# Patient Record
Sex: FEMALE | Race: WHITE | NOT HISPANIC OR LATINO | Employment: STUDENT | URBAN - METROPOLITAN AREA
[De-identification: names, ages, dates, MRNs, and addresses within clinical notes are randomized per-mention and may not be internally consistent; named-entity substitution may affect disease eponyms.]

---
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
## 2025-01-31 ENCOUNTER — OFFICE VISIT (OUTPATIENT)
Dept: PEDIATRICS | Facility: CLINIC | Age: 20
End: 2025-01-31
Payer: COMMERCIAL

## 2025-01-31 VITALS
HEART RATE: 90 BPM | BODY MASS INDEX: 22.16 KG/M2 | DIASTOLIC BLOOD PRESSURE: 74 MMHG | SYSTOLIC BLOOD PRESSURE: 110 MMHG | HEIGHT: 65 IN | WEIGHT: 133 LBS

## 2025-01-31 DIAGNOSIS — G40.909 SEIZURE DISORDER (MULTI): ICD-10-CM

## 2025-01-31 DIAGNOSIS — Z00.00 ENCOUNTER FOR GENERAL ADULT MEDICAL EXAMINATION W/O ABNORMAL FINDINGS: Primary | ICD-10-CM

## 2025-01-31 DIAGNOSIS — K90.0 CELIAC DISEASE (HHS-HCC): ICD-10-CM

## 2025-01-31 DIAGNOSIS — E10.9 TYPE 1 DIABETES MELLITUS WITHOUT COMPLICATION: ICD-10-CM

## 2025-01-31 PROBLEM — H90.42 SENSORINEURAL HEARING LOSS (SNHL) OF LEFT EAR WITH UNRESTRICTED HEARING OF RIGHT EAR: Status: ACTIVE | Noted: 2022-06-15

## 2025-01-31 PROBLEM — G80.8: Status: ACTIVE | Noted: 2019-02-15

## 2025-01-31 PROBLEM — F43.10 PTSD (POST-TRAUMATIC STRESS DISORDER): Status: ACTIVE | Noted: 2023-01-29

## 2025-01-31 PROCEDURE — 3008F BODY MASS INDEX DOCD: CPT | Performed by: PEDIATRICS

## 2025-01-31 PROCEDURE — 1036F TOBACCO NON-USER: CPT | Performed by: PEDIATRICS

## 2025-01-31 PROCEDURE — 3074F SYST BP LT 130 MM HG: CPT | Performed by: PEDIATRICS

## 2025-01-31 PROCEDURE — 3078F DIAST BP <80 MM HG: CPT | Performed by: PEDIATRICS

## 2025-01-31 PROCEDURE — 99395 PREV VISIT EST AGE 18-39: CPT | Performed by: PEDIATRICS

## 2025-01-31 RX ORDER — DOCUSATE SODIUM 100 MG/1
1 CAPSULE, LIQUID FILLED ORAL
COMMUNITY
Start: 2024-03-21

## 2025-01-31 RX ORDER — INSULIN PMP CART,AUT,G6/7,CNTR
EACH SUBCUTANEOUS
COMMUNITY
Start: 2025-01-03

## 2025-01-31 ASSESSMENT — PATIENT HEALTH QUESTIONNAIRE - PHQ9
8. MOVING OR SPEAKING SO SLOWLY THAT OTHER PEOPLE COULD HAVE NOTICED. OR THE OPPOSITE - BEING SO FIDGETY OR RESTLESS THAT YOU HAVE BEEN MOVING AROUND A LOT MORE THAN USUAL: NOT AT ALL
1. LITTLE INTEREST OR PLEASURE IN DOING THINGS: NOT AT ALL
4. FEELING TIRED OR HAVING LITTLE ENERGY: NOT AT ALL
3. TROUBLE FALLING OR STAYING ASLEEP: MORE THAN HALF THE DAYS
5. POOR APPETITE OR OVEREATING: NOT AT ALL
6. FEELING BAD ABOUT YOURSELF - OR THAT YOU ARE A FAILURE OR HAVE LET YOURSELF OR YOUR FAMILY DOWN: NOT AT ALL
2. FEELING DOWN, DEPRESSED OR HOPELESS: NOT AT ALL
9. THOUGHTS THAT YOU WOULD BE BETTER OFF DEAD, OR OF HURTING YOURSELF: NOT AT ALL
7. TROUBLE CONCENTRATING ON THINGS, SUCH AS READING THE NEWSPAPER OR WATCHING TELEVISION: NOT AT ALL

## 2025-01-31 ASSESSMENT — LIFESTYLE VARIABLES
HOW OFTEN DO YOU HAVE A DRINK CONTAINING ALCOHOL: NEVER
HOW OFTEN DO YOU HAVE SIX OR MORE DRINKS ON ONE OCCASION: NEVER
AUDIT-C TOTAL SCORE: 0
HOW MANY STANDARD DRINKS CONTAINING ALCOHOL DO YOU HAVE ON A TYPICAL DAY: PATIENT DOES NOT DRINK
SKIP TO QUESTIONS 9-10: 1

## 2025-01-31 ASSESSMENT — PAIN SCALES - GENERAL: PAINLEVEL_OUTOF10: 0-NO PAIN

## 2025-01-31 NOTE — PROGRESS NOTES
"Subjective     Beverley Bales is a 19 y.o. female who is here for this well-child visit.    Concerns: none    School: [employer], [employer] class - bio  Future Plans: criminal justice    Diet: eats well, some dairy  Exercise:  starting to run  Sleep: tries to get 9 hours  Periods are regular    Anticipatory Guidance:  Always wear seatbelt, do not text and drive, discussed nutrition and exercise, discussed safety and good decision making, recommend annual influenza vaccine.    /74   Pulse 90   Ht 1.657 m (5' 5.25\")   Wt 60.3 kg (133 lb)   BMI 21.96 kg/m²     General:  Well appearing   Eyes:   Sclera clear   Mouth: Mucous membranes moist, lips, teeth, gums normal   Throat clear   Ears: Tympanic membranes normal   Heart Regular rate and rhythm, no murmurs   Lungs clear   Abdomen:  soft, non-tender, no masses, no organomegaly     Assessment and Plan:    1. Encounter for general adult medical examination w/o abnormal findings      doing well, healthy BMI.  we discussed transition to adult physician after 20 year well visit      2. Celiac disease (HHS-HCC)      stable      3. Seizure disorder (Multi)      due for neurology follow up      4. Type 1 diabetes mellitus without complication      stable, recent endocrine note reviewed, due for eye exam        "

## 2025-02-06 ENCOUNTER — CLINICAL SUPPORT (OUTPATIENT)
Dept: AUDIOLOGY | Facility: CLINIC | Age: 20
End: 2025-02-06
Payer: COMMERCIAL

## 2025-02-06 DIAGNOSIS — H90.42 SENSORINEURAL HEARING LOSS (SNHL) OF LEFT EAR WITH UNRESTRICTED HEARING OF RIGHT EAR: ICD-10-CM

## 2025-02-06 PROCEDURE — 92557 COMPREHENSIVE HEARING TEST: CPT | Performed by: SOCIAL WORKER

## 2025-02-06 PROCEDURE — 92550 TYMPANOMETRY & REFLEX THRESH: CPT | Mod: 52 | Performed by: SOCIAL WORKER

## 2025-02-06 NOTE — PROGRESS NOTES
Name: Beverley Bales  YOB: 2005  Age: 19 y.o.    Date of Evaluation:  2/6/25    History:  Reason for visit:  Beverley Bales is seen today at the request of Radha Harris MD   for an evaluation of hearing.  Patient complains of Dizziness (Single sided deafness, Left ear).  Beverley has had single sided deafness since at least . She was previously evaluated at the [facility] in 2022. She previously saw physical therapy for vestibular rehabilitation.  She used to wear a CROS aid system that was dispensed in 2016. She is not currently utilizing any devices.    Evaluation:  Otoscopy revealed clear canals bilaterally  Immittance testing indicated type A normal tympanograms bilaterally  Ipsilateral acoustic reflexes were present in the right ear and absent in the left ear at 500-4000 Hz  Behavioral hearing testing indicated normal hearing at 125-8000 Hz in the right ear and profound in the left ear (no response at the limits of the audiometer)  Word recognition testing was completed using recorded speech at the patient's most comfortable level as documented on the audiogram.    Scores were 96% in the right ear and 0% correct in the left ear      Summary:  Today's results are consistent with normal hearing at 125-8000 Hz in the right ear and profound in the left ear (no response at the limits of the audiometer)  Word recognition is excellent in the right ear and extremely poor in the left ear at conversational loudness  Treatment options were discussed with Beverley and her father.   She was given information regarding CROS hearing aids and bone conduction implant systems.    Treatment Plan:  Follow up with PCP as directed  Retest hearing in 12 months  Consider a CROS hearing aid or ELLA evaluation  Consider vestibular workup

## 2025-03-02 ENCOUNTER — OFFICE VISIT (OUTPATIENT)
Dept: URGENT CARE | Age: 20
End: 2025-03-02
Payer: COMMERCIAL

## 2025-03-02 VITALS
BODY MASS INDEX: 20.73 KG/M2 | HEART RATE: 88 BPM | WEIGHT: 129 LBS | RESPIRATION RATE: 18 BRPM | SYSTOLIC BLOOD PRESSURE: 103 MMHG | OXYGEN SATURATION: 99 % | TEMPERATURE: 98.1 F | HEIGHT: 66 IN | DIASTOLIC BLOOD PRESSURE: 71 MMHG

## 2025-03-02 DIAGNOSIS — B96.89 BACTERIAL URI: ICD-10-CM

## 2025-03-02 DIAGNOSIS — J06.9 BACTERIAL URI: ICD-10-CM

## 2025-03-02 LAB
POC RAPID INFLUENZA A: NEGATIVE
POC RAPID INFLUENZA B: NEGATIVE
POC RAPID MONO: NEGATIVE
POC RAPID STREP: NEGATIVE
POC SARS-COV-2 AG BINAX: NORMAL

## 2025-03-02 PROCEDURE — 99203 OFFICE O/P NEW LOW 30 MIN: CPT | Performed by: SURGERY

## 2025-03-02 PROCEDURE — 87811 SARS-COV-2 COVID19 W/OPTIC: CPT | Performed by: SURGERY

## 2025-03-02 PROCEDURE — 86308 HETEROPHILE ANTIBODY SCREEN: CPT | Performed by: SURGERY

## 2025-03-02 PROCEDURE — 1036F TOBACCO NON-USER: CPT | Performed by: SURGERY

## 2025-03-02 PROCEDURE — 87804 INFLUENZA ASSAY W/OPTIC: CPT | Performed by: SURGERY

## 2025-03-02 PROCEDURE — 3074F SYST BP LT 130 MM HG: CPT | Performed by: SURGERY

## 2025-03-02 PROCEDURE — 87880 STREP A ASSAY W/OPTIC: CPT | Performed by: SURGERY

## 2025-03-02 PROCEDURE — 3078F DIAST BP <80 MM HG: CPT | Performed by: SURGERY

## 2025-03-02 PROCEDURE — 3008F BODY MASS INDEX DOCD: CPT | Performed by: SURGERY

## 2025-03-02 RX ORDER — AZITHROMYCIN 250 MG/1
TABLET, FILM COATED ORAL
Qty: 6 TABLET | Refills: 0 | Status: SHIPPED | OUTPATIENT
Start: 2025-03-02 | End: 2025-03-07

## 2025-03-02 NOTE — PROGRESS NOTES
Chief Complaint   Patient presents with    Sore Throat     Sore throat, swollen lymph nodes, cough x 7 days.       Physical Exam:     GEN: No acute distress    ENT: Bilateral TMs and canals unremarkable, sinus congestion present. Pharynx and tonsils mildly hyperemic but without exudate.     Resp: Lungs clear to auscultation bilaterally       POC Labs:     Office Visit on 03/02/2025   Component Date Value Ref Range Status    POC Rapid Strep 03/02/2025 Negative  Negative Final    POC MICHAEL-COV-2 AG 03/02/2025 Presumptive negative test for SARS-CoV-2 (no antigen detected)  Presumptive negative test for SARS-CoV-2 (no antigen detected) Final    POC Rapid Mono 03/02/2025 Negative  Negative Final    POC Rapid Influenza A 03/02/2025 Negative  Negative Final    POC Rapid Influenza B 03/02/2025 Negative  Negative Final        Encounter Diagnosis   Name Primary?    Bacterial URI         Medical Decision Making & Plan:   Azithromycin    Home      03/02/25 at 10:17 AM - Janeth Lloyd, DO

## 2025-04-11 ENCOUNTER — NUTRITION (OUTPATIENT)
Dept: PEDIATRIC ENDOCRINOLOGY | Facility: CLINIC | Age: 20
End: 2025-04-11

## 2025-04-11 ENCOUNTER — OFFICE VISIT (OUTPATIENT)
Dept: PEDIATRIC ENDOCRINOLOGY | Facility: CLINIC | Age: 20
End: 2025-04-11
Payer: COMMERCIAL

## 2025-04-11 VITALS
BODY MASS INDEX: 22.77 KG/M2 | OXYGEN SATURATION: 100 % | WEIGHT: 136.69 LBS | HEART RATE: 76 BPM | DIASTOLIC BLOOD PRESSURE: 67 MMHG | SYSTOLIC BLOOD PRESSURE: 100 MMHG | HEIGHT: 65 IN

## 2025-04-11 DIAGNOSIS — E10.9 TYPE 1 DIABETES MELLITUS WITHOUT COMPLICATION: ICD-10-CM

## 2025-04-11 LAB — POC HEMOGLOBIN A1C: 7.1 % (ref 4.2–6.5)

## 2025-04-11 PROCEDURE — 3078F DIAST BP <80 MM HG: CPT | Performed by: PEDIATRICS

## 2025-04-11 PROCEDURE — 3008F BODY MASS INDEX DOCD: CPT | Performed by: PEDIATRICS

## 2025-04-11 PROCEDURE — 3074F SYST BP LT 130 MM HG: CPT | Performed by: PEDIATRICS

## 2025-04-11 PROCEDURE — 99214 OFFICE O/P EST MOD 30 MIN: CPT | Mod: 25 | Performed by: PEDIATRICS

## 2025-04-11 PROCEDURE — 83036 HEMOGLOBIN GLYCOSYLATED A1C: CPT | Performed by: PEDIATRICS

## 2025-04-11 PROCEDURE — 99214 OFFICE O/P EST MOD 30 MIN: CPT | Performed by: PEDIATRICS

## 2025-04-11 PROCEDURE — 95251 CONT GLUC MNTR ANALYSIS I&R: CPT | Performed by: PEDIATRICS

## 2025-04-11 PROCEDURE — 3051F HG A1C>EQUAL 7.0%<8.0%: CPT | Performed by: PEDIATRICS

## 2025-04-11 RX ORDER — URINE ACETONE TEST STRIPS
1 STRIP MISCELLANEOUS AS NEEDED
Qty: 25 EACH | Refills: 11 | Status: SHIPPED | OUTPATIENT
Start: 2025-04-11 | End: 2025-04-14 | Stop reason: SDUPTHER

## 2025-04-11 ASSESSMENT — PAIN SCALES - GENERAL: PAINLEVEL_OUTOF10: 0-NO PAIN

## 2025-04-11 NOTE — PROGRESS NOTES
[facility]  [facility]    Subjective   Beverley Bales is a 19 y.o. female with type 1 diabetes. Dx'd 2011.   Today Beverley presents to clinic with her father.     HPI  Other Medical History:   H/o seizure disorder, on lamictal, no seizures for about a decade.  Has vertigo, on OTC magnesium     Manages diabetes with OP5+Dexcom G6  TIR: 76%  Lows: 2%  High: 22%     Auto mode: 97%    TDD: 43.2 U  24 hr basal: 21.3 U  Bolus/day: 5  Carbs: 110 g/daily    Concerns at this visit: Excessive urination for about 2 months, mostly at night, wakes up several times to pee. Denies drinking fluids before bedtime.      Social:    Doing well at [employer].     Insulin Injections/Pump sites:   - Gives mealtime insulin before eating.  - Site rotation: yes     Carbohydrate counting:   - Patient states they are good at counting carbs.  - Patient states they are good at adherence to bolusing for carbs.     Other:   Hypoglycemia:  - uses glucose tabs to treat lows  - treats with 2 gms carbs  - Nocturnal hypoglycemia: no  Checks ketones with: ketone strips.      Exercise: No exercise now, before running.     Periods: regular      Education Reviewed:      Goals    None              Insulin Delivery  Diabetes Management Regimen: (Patient-Rptd) Pump  Pump Type: (Patient-Rptd) Omnipod  Using AID System: (Patient-Rptd) Yes  Using Smart Pen device: (Patient-Rptd) No    Glucose Monitoring  How do you primarily monitor blood sugars?: (Patient-Rptd) CGM  CGM Type: (Patient-Rptd) Dexcom G6    Clinical Details  Hypoglycemia Unawareness : (Patient-Rptd) No  Severe Hypoglycemia (coma, seizure, disorientation, or the need for high dose glucagon) since last visit: (Patient-Rptd) No    Hospitalizations (since last endocrine appointment)  ED/Hospitalizations related to Diabetes: (Patient-Rptd) No  ED/Hospitalization not related to Diabetes: (Patient-Rptd) No  ED/Hospitalization related  "to DKA: (Patient-Rptd) No                   Review of Systems  12 point review of systems has been performed. Except for what has been documented, review of systems was otherwise negative.   Objective   /67 (BP Location: Right arm, Patient Position: Sitting)   Pulse 76   Ht 1.651 m (5' 5\")   Wt 62 kg (136 lb 11 oz)   SpO2 100%   BMI 22.75 kg/m²      Physical Exam   Constitutional: Alert and awake, no acute distress.   Eyes: EOMI   ENMT: Moist oral mucosa.   Head/Neck: Supple, no masses, thyroid not enlarged, no palpable nodules.    Respiratory/Thorax: CTAB, no rales or crackles.   Cardiovascular: RRR, no murmurs.   Gastrointestinal: Soft, NT/ND.   Neurological: Alert, no focal deficits.   Skin: Warm, well perfused. No acanthosis. No lipodystrophy.   Lab  Lab Results   Component Value Date    HGBA1C 7.1 (A) 04/11/2025    HGBA1C 7.5 (A) 12/13/2024    HGBA1C 7.7 (H) 11/01/2024    HGBA1C 6.9 (A) 09/13/2024       Assessment/Plan   Beverley Bales is a 19 y.o. female with type 1 diabetes.    Glucose Monitoring: She has some days where she gives split carb boluses and sometimes gives boluses after eating, and her glucoses after then usually goes low. Also a few days, she went low after correction only boluses.      Plan:    Problem List Items Addressed This Visit             ICD-10-CM    Type 1 diabetes mellitus E10.9    Relevant Medications    acetone, urine, test (Ketostix) strip    Other Relevant Orders    Urinalysis with Reflex Culture and Microscopic    Urinalysis with Reflex Microscopic    Osmolality, Urine       -A1C better at 7.1 today. Congratulated pt.   -Advised to give carb boluses all together upfront, and to enter it before eating to avoid lows.  -Only insulin dose change-loosened correction factor.  -Up to date with labs and eye exam. Good BP  -Ordered UA and urine osm to evaluate her nocturia.   -Refills sent.   -f/up 3 months.     Discussed with attending Dr Missael Palafox, " MD  Peds Endocrine Fellow     Insulin Instructions  Pump Settings   insulin lispro 100 unit/mL injection   Last edited by Brandi Palafox MD on 4/11/2025 at 11:57 AM      Insulin action 3 hrs      Basal Rate   Total Basal Dose: 22.9 units/day   Time units/hr   12:00 AM 1.05   11:00 AM 0.85    9:00 PM 0.95      Blood Glucose Target   Time mg/dL   12:00  - 120    9:00  - 120    3:00  - 120      Sensitivity Factor   Time mg/dL/unit   12:00 AM 45      Carb Ratio   Time g/unit   12:00 AM 8    5:00 AM 6   11:00 AM 6    2:00 PM 5.5    7:00 PM 5     Fixed Dose Injections   insulin glargine-yfgn 100 unit/mL (3 mL) pen   Last edited by Brandi Palafox MD on 4/11/2025 at 11:58 AM      Time of Day Dose (units)   As pump backup 22       CGM Interpretation/Plan   14 day CGM download was reviewed in detail as documented above under GLUCOSE MONITORING and will be attached to chart.  A minimum of 72 hours of glucose data was used to inform the management plan outlined above.    Brandi Palafox MD   resident/fellow. I reviewed the resident/fellow's documentation and discussed the patient with the resident/fellow. I agree with the resident/fellow's medical decision making as documented in the note.    Carline Canas, DO

## 2025-04-11 NOTE — PATIENT INSTRUCTIONS
It was great to see you today.    Your A1C is great, 7.1! Great job!    We made a small change in your insulin doses, no major changes, please try to not split carb boluses, and give it before eating instead of later.    Follow up in 3 months.    Your endocrine doctors: Dr Canas (attending) and Dr Palafox(fellow).     Contact information:   General phone number: [phone]   Fax: [phone]      Insulin Instructions  Pump Settings   insulin lispro 100 unit/mL injection   Last edited by Brandi Palafox MD on 4/11/2025 at 11:57 AM      Insulin action 3 hrs      Basal Rate   Total Basal Dose: 22.9 units/day   Time units/hr   12:00 AM 1.05   11:00 AM 0.85    9:00 PM 0.95      Blood Glucose Target   Time mg/dL   12:00  - 120    9:00  - 120    3:00  - 120      Sensitivity Factor   Time mg/dL/unit   12:00 AM 45      Carb Ratio   Time g/unit   12:00 AM 8    5:00 AM 6   11:00 AM 6    2:00 PM 5.5    7:00 PM 5     Fixed Dose Injections   insulin glargine-yfgn 100 unit/mL (3 mL) pen   Last edited by Barndi Palafox MD on 4/11/2025 at 11:58 AM      Time of Day Dose (units)   As pump backup 22

## 2025-04-11 NOTE — PROGRESS NOTES
"Reason for Nutrition Visit:  Pt is a 19 y.o. female being seen for T1DM.     Past Medical Hx:  Patient Active Problem List   Diagnosis    Celiac disease (HHS-HCC)    Increased immunoglobulin    Polymicrogyria (Multi)    Seizure disorder (Multi)    Type 1 diabetes mellitus    Intellectual disability    Sensorineural hearing loss (SNHL) of left ear with unrestricted hearing of right ear        24 Diet Recall:  Meal 1: breakfast - protein shake slimfast (7g carb, 20g protein. Boluses to cover both ~20g)   Meal 2: snack at 2pm - 30-40g carb  - protein bars, dried cereal  Meal 3: dinner - varies. Yesterday: Chicken, rice, cheese. (Starts with 45g). Sometimes has chips with it and will bolus later.  Snacks: usually no.     Beverages: water, zero sugar gatorade.     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 Eats her 2pm snack before this.     Meals with higher fat - breaks up bolus.    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       Lab Results   Component Value Date    HGBA1C 7.1 (A) 04/11/2025    CHOL 158 11/01/2024    LDLF 90 09/01/2023    TRIG 49 11/01/2024      Results for orders placed or performed in visit on 04/11/25   POCT glycosylated hemoglobin (Hb A1C) manually resulted    Collection Time: 04/11/25 11:03 AM   Result Value Ref Range    POC HEMOGLOBIN A1c 7.1 (A) " 4.2 - 6.5 %       Insulin Instructions  Pump Settings   insulin lispro 100 unit/mL injection   Last edited by Jessie Rosas RN on 9/13/2024 at 11:21 AM      Insulin action 3 hrs      Basal Rate   Total Basal Dose: 22.9 units/day   Time units/hr   12:00 AM 1.05   11:00 AM 0.85    9:00 PM 0.95      Blood Glucose Target   Time mg/dL   12:00  - 120    9:00  - 120    3:00  - 120      Sensitivity Factor   Time mg/dL/unit   12:00 AM 40      Carb Ratio   Time g/unit   12:00 AM 8    5:00 AM 6   11:00 AM 6    2:00 PM 5.5    7:00 PM 5         Medications:   Current Outpatient Medications on File Prior to Visit   Medication Sig Dispense Refill    acetone, urine, test (Ketostix) strip 1 strip by Does not apply route if needed for high blood sugar. 25 each 11    chlorhexidine (Peridex) 0.12 % solution  (Patient not taking: Reported on 1/31/2025)      Dexcom G6 Sensor device Use one sensor every 10 days for continuous glucose monitoring 9 each 3    Dexcom G6 Transmitter device CHANGE TRANSMITTER EVERY 3 MONTHS FOR GLUCOSE MONITORING 1 each 3    docusate sodium (Colace) 100 mg capsule Take 1 capsule (100 mg) by mouth every 12 hours. (Patient not taking: Reported on 1/31/2025)      glucagon (Baqsimi) 3 mg/actuation spray,non-aerosol Instill 3mg intranasally for severe hypoglycemia 2 each 3    ibuprofen 800 mg tablet TAKE 1 TABLET BY MOUTH EVERY 6 HOURS WITH FOOD AS NEEDED FOR MODERATE PAIN      insulin glargine-yfgn (Semglee,insulin glarg-yfgn,Pen) 100 unit/mL (3 mL) Pen Inject up to 23 units in case of pump failure 15 mL 11    insulin lispro (HumaLOG) 100 unit/mL injection INJECT UP TO 80 UNITS DAILY VIA INSULIN PUMP AS DIRECTED 90 mL 3    lamoTRIgine (LaMICtal) 100 mg tablet Take 1 tablet (100 mg) by mouth 2 times a day. (Patient not taking: Reported on 1/31/2025)      lamoTRIgine (LaMICtal) 200 mg disintegrating tablet every 12 hours.      multivit with min-folic acid (Adult Multivitamin Gummies) 200 mcg  tablet,chewable Chew.      nasal spray midazolam (Nayzilam) 5 mg/spray (0.1 mL) spray,non-aerosol Administer into affected nostril(s).      Omnipod 5 G6 Pods, Gen 5, cartridge Inject 1 Cartridge under the skin every other day. 45 each 3    Omnipod 5 G6-G7 Pods, Gen 5, cartridge INJECT 1 CARTRIDFE UNDER THE SKIN EVERY OTHER DAY      ondansetron (Zofran) 8 mg tablet 1 tablet as needed Orally every 12 hours prn for 7 day(s) (Patient not taking: Reported on 1/31/2025)      OneTouch Delica Plus Lancet 33 gauge misc TEST BLOOD SUGAR 6 TO 7 TIMES A  each 3    OneTouch Verio Flex meter misc USE AS DIRECTED TO TEST BLOOD SUGAR      OneTouch Verio test strips strip TEST BLOOD SUGAR 6 TO 7 TIMES A  strip 3    oxyCODONE-acetaminophen (Percocet) 5-325 mg tablet TAKE 1 TABLET BY MOUTH EVERY 6 HOURS AS NEEDED FOR SEVERE PAIN FOR 3 DAYS. (Patient not taking: Reported on 1/31/2025)      polyethylene glycol (Miralax) 17 gram/dose powder Take by mouth. (Patient not taking: Mix of powder and drink. Reported on 1/31/2025)      [DISCONTINUED] acetone, urine, test (Ketostix) strip test urine for ketones if blood sugar >250, with illness, or if pump malfunctions       No current facility-administered medications on file prior to visit.        Estimated Energy Needs: [id number]    Nutrition Diagnosis:    Diagnosis Statement 1:  Diagnosis Status: New  Diagnosis : Altered nutrition related lab values  related to  T1DM  as evidenced by  dietary recall with emphasis on counting carbs and timing boluses of high protein/fat meals    Nutrition Intervention:    Met with patient and dad in clinic today for annual nutrition touch base. Reviewed typical day of eating and carb counts. Patient doing excellent job identifying and counting carbs. No issues with specific foods causing adverse blood glucose trends. She has no concerns or nutrition questions at this time.     Nutrition Goals:  Continue doing a great job identifying and counting  denise.

## 2025-04-12 LAB
APPEARANCE UR: CLEAR
BACTERIA #/AREA URNS HPF: ABNORMAL /HPF
BILIRUB UR QL STRIP: NEGATIVE
COLOR UR: YELLOW
GLUCOSE UR QL STRIP: NEGATIVE
HGB UR QL STRIP: NEGATIVE
HYALINE CASTS #/AREA URNS LPF: ABNORMAL /LPF
KETONES UR QL STRIP: NEGATIVE
LEUKOCYTE ESTERASE UR QL STRIP: ABNORMAL
NITRITE UR QL STRIP: NEGATIVE
OSMOLALITY UR: NORMAL MOSM/KG
PH UR STRIP: 7 [PH] (ref 5–8)
PROT UR QL STRIP: NEGATIVE
RBC #/AREA URNS HPF: ABNORMAL /HPF
SERVICE CMNT-IMP: ABNORMAL
SP GR UR STRIP: 1.02 (ref 1–1.03)
SQUAMOUS #/AREA URNS HPF: ABNORMAL /HPF
WBC #/AREA URNS HPF: ABNORMAL /HPF

## 2025-04-14 DIAGNOSIS — E10.9 TYPE 1 DIABETES MELLITUS WITHOUT COMPLICATION: ICD-10-CM

## 2025-04-14 LAB
APPEARANCE UR: CLEAR
BACTERIA #/AREA URNS HPF: ABNORMAL /HPF
BILIRUB UR QL STRIP: NEGATIVE
COLOR UR: YELLOW
GLUCOSE UR QL STRIP: NEGATIVE
HGB UR QL STRIP: NEGATIVE
HYALINE CASTS #/AREA URNS LPF: ABNORMAL /LPF
KETONES UR QL STRIP: NEGATIVE
LEUKOCYTE ESTERASE UR QL STRIP: ABNORMAL
NITRITE UR QL STRIP: NEGATIVE
OSMOLALITY UR: 909 MOSM/KG (ref 50–1200)
PH UR STRIP: 7 [PH] (ref 5–8)
PROT UR QL STRIP: NEGATIVE
RBC #/AREA URNS HPF: ABNORMAL /HPF
SERVICE CMNT-IMP: ABNORMAL
SP GR UR STRIP: 1.02 (ref 1–1.03)
SQUAMOUS #/AREA URNS HPF: ABNORMAL /HPF
WBC #/AREA URNS HPF: ABNORMAL /HPF

## 2025-04-14 RX ORDER — URINE ACETONE TEST STRIPS
STRIP MISCELLANEOUS
Qty: 50 EACH | Refills: 11 | Status: SHIPPED | OUTPATIENT
Start: 2025-04-14 | End: 2025-04-16 | Stop reason: SDUPTHER

## 2025-04-16 DIAGNOSIS — E10.9 TYPE 1 DIABETES MELLITUS WITHOUT COMPLICATION: ICD-10-CM

## 2025-04-16 RX ORDER — URINE ACETONE TEST STRIPS
STRIP MISCELLANEOUS
Qty: 50 EACH | Refills: 11 | Status: SHIPPED | OUTPATIENT
Start: 2025-04-16 | End: 2025-04-21 | Stop reason: SDUPTHER

## 2025-04-20 DIAGNOSIS — E10.9 TYPE 1 DIABETES MELLITUS WITHOUT COMPLICATION: ICD-10-CM

## 2025-04-21 DIAGNOSIS — E10.9 TYPE 1 DIABETES MELLITUS WITHOUT COMPLICATION: ICD-10-CM

## 2025-04-21 RX ORDER — URINE ACETONE TEST STRIPS
1 STRIP MISCELLANEOUS AS NEEDED
Qty: 50 EACH | Refills: 11 | Status: SHIPPED | OUTPATIENT
Start: 2025-04-21 | End: 2025-05-21

## 2025-04-21 RX ORDER — BLOOD-GLUCOSE SENSOR
EACH MISCELLANEOUS
Qty: 9 EACH | Refills: 3 | Status: SHIPPED | OUTPATIENT
Start: 2025-04-21

## 2025-07-11 ENCOUNTER — OFFICE VISIT (OUTPATIENT)
Dept: PEDIATRIC ENDOCRINOLOGY | Facility: CLINIC | Age: 20
End: 2025-07-11
Payer: COMMERCIAL

## 2025-07-11 VITALS
DIASTOLIC BLOOD PRESSURE: 71 MMHG | HEIGHT: 66 IN | WEIGHT: 140.87 LBS | BODY MASS INDEX: 22.64 KG/M2 | OXYGEN SATURATION: 100 % | HEART RATE: 98 BPM | SYSTOLIC BLOOD PRESSURE: 101 MMHG

## 2025-07-11 DIAGNOSIS — E10.9 TYPE 1 DIABETES MELLITUS WITH HEMOGLOBIN A1C GOAL OF LESS THAN 7.0% (MULTI): Primary | ICD-10-CM

## 2025-07-11 DIAGNOSIS — E10.9 TYPE 1 DIABETES MELLITUS WITHOUT COMPLICATION: ICD-10-CM

## 2025-07-11 LAB — POC HEMOGLOBIN A1C: 6.7 % (ref 4.2–6.5)

## 2025-07-11 PROCEDURE — 99214 OFFICE O/P EST MOD 30 MIN: CPT | Performed by: PEDIATRICS

## 2025-07-11 PROCEDURE — 83036 HEMOGLOBIN GLYCOSYLATED A1C: CPT

## 2025-07-11 PROCEDURE — 3078F DIAST BP <80 MM HG: CPT | Performed by: PEDIATRICS

## 2025-07-11 PROCEDURE — 99214 OFFICE O/P EST MOD 30 MIN: CPT | Mod: 25 | Performed by: PEDIATRICS

## 2025-07-11 PROCEDURE — 3074F SYST BP LT 130 MM HG: CPT | Performed by: PEDIATRICS

## 2025-07-11 PROCEDURE — 3008F BODY MASS INDEX DOCD: CPT | Performed by: PEDIATRICS

## 2025-07-11 PROCEDURE — 83036 HEMOGLOBIN GLYCOSYLATED A1C: CPT | Mod: QW | Performed by: PEDIATRICS

## 2025-07-11 PROCEDURE — 3044F HG A1C LEVEL LT 7.0%: CPT | Performed by: PEDIATRICS

## 2025-07-11 RX ORDER — BLOOD-GLUCOSE SENSOR
EACH MISCELLANEOUS
Qty: 3 EACH | Refills: 11 | Status: SHIPPED | OUTPATIENT
Start: 2025-07-11

## 2025-07-11 RX ORDER — GLUCAGON 3 MG/1
POWDER NASAL
Qty: 2 EACH | Refills: 3 | Status: SHIPPED | OUTPATIENT
Start: 2025-07-11

## 2025-07-11 RX ORDER — INSULIN LISPRO 100 [IU]/ML
INJECTION, SOLUTION INTRAVENOUS; SUBCUTANEOUS
Qty: 30 ML | Refills: 11 | Status: SHIPPED | OUTPATIENT
Start: 2025-07-11

## 2025-07-11 ASSESSMENT — ENCOUNTER SYMPTOMS
DEPRESSION: 0
OCCASIONAL FEELINGS OF UNSTEADINESS: 0
LOSS OF SENSATION IN FEET: 0

## 2025-07-11 ASSESSMENT — PAIN SCALES - GENERAL: PAINLEVEL_OUTOF10: 0-NO PAIN

## 2025-07-11 NOTE — PROGRESS NOTES
[facility]  [facility]    Subjective   Beverley Bales is a 19 y.o. female with type 1 diabetes.   Today Beverley presents to clinic with her father.     HPI  Other Medical History:    Celiac disease  epilepsy  Manages diabetes with    Omnipod 5  Insulin Instructions  Pump Settings   insulin lispro 100 unit/mL injection   Last edited by Brandi Palafox MD on 4/11/2025 at 11:57 AM      Insulin action 3 hrs      Basal Rate   Total Basal Dose: 22.9 units/day   Time units/hr   12:00 AM 1.05   11:00 AM 0.85    9:00 PM 0.95      Blood Glucose Target   Time mg/dL   12:00  - 120    9:00  - 120    3:00  - 120      Sensitivity Factor   Time mg/dL/unit   12:00 AM 45      Carb Ratio   Time g/unit   12:00 AM 8    5:00 AM 6   11:00 AM 6    2:00 PM 5.5    7:00 PM 5     Fixed Dose Injections   insulin glargine-yfgn 100 unit/mL (3 mL) pen   Last edited by Brandi Palafox MD on 4/11/2025 at 11:58 AM      Time of Day Dose (units)   As pump backup 22       Concerns at this visit:   None     Social: works part time  CSU in the fall     Insulin Injections/Pump sites:   - Gives mealtime insulin before eating.  - Site rotation: arms. legs     Carbohydrate counting:   - Patient states they are good at counting carbs.  - Patient states they are good at adherence to bolusing for carbs.     Other:   Hypoglycemia:  - uses juice, candy to treat lows  - Nocturnal hypoglycemia: yes  Checks ketones with: illness, high BG     Exercise: no organized sports     Education Reviewed: advances in technology, changing targets, settings     Goals    None              Insulin Delivery  Diabetes Management Regimen: (Patient-Rptd) Pump  Pump Type: Omnipod  Using AID System: (Patient-Rptd) Yes  Boluses Per Day (user initiated): 4.4  Total Daily Dose of Insulin (units): 41.8  Total Basal Insulin Per Day (units): 24  % Basal: 57.42  % Bolus: 42.58  Total Daily Carbs (grams): 97.4  Percent  "Automated Mode (%): 90  Using Smart Pen device: (Patient-Rptd) No    Glucose Monitoring  How do you primarily monitor blood sugars?: CGM  CGM Type: Dexcom G6  Time in range 70-180mg/dL (%): 68  Time low <70mg/dL (%): 2  Time high >180mg/dL (%): 30  Average Glucose: 160  Predicted GMI: 7.1    Clinical Details  Hypoglycemia Unawareness : (Patient-Rptd) No  Severe Hypoglycemia (coma, seizure, disorientation, or the need for high dose glucagon) since last visit: (Patient-Rptd) No    Hospitalizations (since last endocrine appointment)  ED/Hospitalizations related to Diabetes: (Patient-Rptd) No  ED/Hospitalization not related to Diabetes: (Patient-Rptd) No  ED/Hospitalization related to DKA: (Patient-Rptd) No                   Review of Systems-trouble staying asleep    Objective   /71   Pulse 98   Ht 1.667 m (5' 5.63\")   Wt 63.9 kg (140 lb 14 oz)   SpO2 100%   BMI 22.99 kg/m²      Physical Exam   General: interactive in NAD  Injection/pump/sensor sites: no hypertrophies, no bruising or signs of infection or allergic reaction  Fundi:   Neck: No lymphadenopathy  Heart: no edema or cyanosis  Chest/Lungs: unlabored breathing   Abdomen: Soft, non-tender  Neuro: Grossly Intact  Extremities: normal,  Feet: normal   Thyroid: normal       Enlargement: not enlarged       Consistency: soft       Surface: smooth  Sexual Development: regular periods    Lab  Lab Results   Component Value Date    HGBA1C 7.1 (A) 04/11/2025    HGBA1C 7.5 (A) 12/13/2024    HGBA1C 7.7 (H) 11/01/2024    HGBA1C 6.9 (A) 09/13/2024       Assessment/Plan   Beverley Bales is a 19 y.o. female with celiac,  epilepsy and type 1 diabetes since 2011 treated with Omnipod5 .   A1C is 6.7 %, in target and has been stable  since last visit.   Challenges include:   BP is normal,  weight is stable.   Insulin pump / sensor reports were reviewed for patterns (see CGM interpretation) and insulin dose adjustments were made (see insulin instructions). "     Patient is up-to-date with annual surveillance tests /    Glucose Monitoring: CGM Interpretation/Plan:  14 day CGM download was reviewed with family, download scanned into EMR see above for statistics. There is pattern of expected glycemic fluctuation  - no dose changes   - encouraged dose (carb entering) tweaking, depending on the types/ amounts/ glucemic index of foods  - encouraged phys activities  FUV 3 mos           Insulin Instructions  Pump Settings   insulin lispro 100 unit/mL injection   Last edited by Brandi Palafox MD on 4/11/2025 at 11:57 AM      Insulin action 3 hrs      Basal Rate   Total Basal Dose: 22.9 units/day   Time units/hr   12:00 AM 1.05   11:00 AM 0.85    9:00 PM 0.95      Blood Glucose Target   Time mg/dL   12:00  - 120    9:00  - 120    3:00  - 120      Sensitivity Factor   Time mg/dL/unit   12:00 AM 45      Carb Ratio   Time g/unit   12:00 AM 8    5:00 AM 6   11:00 AM 6    2:00 PM 5.5    7:00 PM 5     Fixed Dose Injections   insulin glargine-yfgn 100 unit/mL (3 mL) pen   Last edited by Brandi Palafox MD on 4/11/2025 at 11:58 AM      Time of Day Dose (units)   As pump backup 22         Michelle Azar MD

## 2025-07-11 NOTE — PATIENT INSTRUCTIONS
Nice to see you!   You are doing great!  A1c is 6.7% with 68% time in range and 2% lows.      Plan:  No dose changes today.  If you notice blood glucose spikes with meals consistently, you can strengthen carb ratios as discussed.  Switch to Dexcom G7  Follow up in 3 months

## 2025-07-14 DIAGNOSIS — E10.9 TYPE 1 DIABETES MELLITUS WITH HEMOGLOBIN A1C GOAL OF LESS THAN 7.0% (MULTI): ICD-10-CM

## 2025-07-15 RX ORDER — INSULIN PMP CART,AUT,G6/7,CNTR
1 EACH SUBCUTANEOUS
Qty: 45 EACH | Refills: 3 | Status: SHIPPED | OUTPATIENT
Start: 2025-07-15

## 2025-07-17 DIAGNOSIS — E10.9 TYPE 1 DIABETES MELLITUS WITHOUT COMPLICATION: ICD-10-CM

## 2025-07-18 RX ORDER — INSULIN LISPRO 100 [IU]/ML
INJECTION, SOLUTION INTRAVENOUS; SUBCUTANEOUS
Qty: 30 ML | Refills: 11 | Status: SHIPPED | OUTPATIENT
Start: 2025-07-18